# Patient Record
Sex: FEMALE | Race: WHITE | NOT HISPANIC OR LATINO | Employment: UNEMPLOYED | URBAN - NONMETROPOLITAN AREA
[De-identification: names, ages, dates, MRNs, and addresses within clinical notes are randomized per-mention and may not be internally consistent; named-entity substitution may affect disease eponyms.]

---
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
## 2025-02-18 ENCOUNTER — HOSPITAL ENCOUNTER (EMERGENCY)
Facility: HOSPITAL | Age: 20
Discharge: HOME | End: 2025-02-18
Attending: STUDENT IN AN ORGANIZED HEALTH CARE EDUCATION/TRAINING PROGRAM | Admitting: STUDENT IN AN ORGANIZED HEALTH CARE EDUCATION/TRAINING PROGRAM
Payer: COMMERCIAL

## 2025-02-18 VITALS
HEART RATE: 70 BPM | WEIGHT: 126 LBS | OXYGEN SATURATION: 100 % | SYSTOLIC BLOOD PRESSURE: 136 MMHG | TEMPERATURE: 97.9 F | BODY MASS INDEX: 20.99 KG/M2 | DIASTOLIC BLOOD PRESSURE: 86 MMHG | HEIGHT: 65 IN | RESPIRATION RATE: 16 BRPM

## 2025-02-18 DIAGNOSIS — N89.8 VAGINAL DISCHARGE: ICD-10-CM

## 2025-02-18 DIAGNOSIS — R10.2 VAGINAL PAIN: Primary | ICD-10-CM

## 2025-02-18 LAB
BACTERIA UR QL AUTO: ABNORMAL /HPF
BILIRUB UR QL STRIP: NEGATIVE
CLARITY UR: CLEAR
CLUE CELLS SPEC QL WET PREP: ABNORMAL
COLOR UR: YELLOW
GLUCOSE UR STRIP-MCNC: NEGATIVE MG/DL
HGB UR QL STRIP.AUTO: ABNORMAL
HYALINE CASTS UR QL AUTO: ABNORMAL /LPF
HYDATID CYST SPEC WET PREP: ABNORMAL
KETONES UR QL STRIP: NEGATIVE
KOH PREP NAIL: NORMAL
LEUKOCYTE ESTERASE UR QL STRIP.AUTO: ABNORMAL
NITRITE UR QL STRIP: NEGATIVE
PH UR STRIP.AUTO: 7.5 [PH] (ref 5–8)
PROT UR QL STRIP: NEGATIVE
RBC # UR STRIP: ABNORMAL /HPF
REF LAB TEST METHOD: ABNORMAL
SP GR UR STRIP: 1.01 (ref 1–1.03)
SQUAMOUS #/AREA URNS HPF: ABNORMAL /HPF
T VAGINALIS SPEC QL WET PREP: ABNORMAL
UROBILINOGEN UR QL STRIP: ABNORMAL
WBC # UR STRIP: ABNORMAL /HPF
WBC SPEC QL WET PREP: ABNORMAL
YEAST GENITAL QL WET PREP: ABNORMAL

## 2025-02-18 PROCEDURE — 99284 EMERGENCY DEPT VISIT MOD MDM: CPT | Performed by: STUDENT IN AN ORGANIZED HEALTH CARE EDUCATION/TRAINING PROGRAM

## 2025-02-18 PROCEDURE — 87491 CHLMYD TRACH DNA AMP PROBE: CPT

## 2025-02-18 PROCEDURE — 81001 URINALYSIS AUTO W/SCOPE: CPT

## 2025-02-18 PROCEDURE — 87591 N.GONORRHOEAE DNA AMP PROB: CPT

## 2025-02-18 PROCEDURE — 87220 TISSUE EXAM FOR FUNGI: CPT

## 2025-02-18 PROCEDURE — 87210 SMEAR WET MOUNT SALINE/INK: CPT

## 2025-02-18 RX ORDER — METHYLPHENIDATE HYDROCHLORIDE 27 MG/1
27 TABLET, EXTENDED RELEASE ORAL EVERY MORNING
COMMUNITY
Start: 2025-02-11

## 2025-02-18 RX ORDER — FLUCONAZOLE 200 MG/1
TABLET ORAL
Qty: 2 TABLET | Refills: 0 | Status: SHIPPED | OUTPATIENT
Start: 2025-02-18

## 2025-02-18 RX ORDER — METRONIDAZOLE 500 MG/1
500 TABLET ORAL 2 TIMES DAILY
Qty: 28 TABLET | Refills: 0 | Status: SHIPPED | OUTPATIENT
Start: 2025-02-18 | End: 2025-03-04

## 2025-02-18 RX ORDER — DOXYCYCLINE 100 MG/1
100 CAPSULE ORAL 2 TIMES DAILY
Qty: 28 CAPSULE | Refills: 0 | Status: SHIPPED | OUTPATIENT
Start: 2025-02-18 | End: 2025-03-04

## 2025-02-18 NOTE — ED PROVIDER NOTES
EMERGENCY DEPARTMENT ENCOUNTER    Pt Name: Candace Jain  MRN: [id number]  Pt :   2005  Room Number:  01SF/01  Date of encounter:  2025  PCP: Provider, No Known  ED Provider: Luis Miguel Padron PA-C    Historian: Patient, patient's sister at bedside, nursing note      HPI:  Chief Complaint: Vaginal burning irritation and white discharge        Context: Candace Jain is a 19 y.o. female who presents to the ED c/o vaginal burning and irritation for the past 3 days.  Patient also reports some very mild whitish colored discoloration and discharge when wiping.  She reports that her vagina is itchy.  She denies any abdominal or pelvic pain, vaginal bleeding, or any other complaint today.      PAST MEDICAL HISTORY  Past Medical History:   Diagnosis Date    ADHD          PAST SURGICAL HISTORY  History reviewed. No pertinent surgical history.      FAMILY HISTORY  History reviewed. No pertinent family history.      SOCIAL HISTORY  Social History     Socioeconomic History    Marital status: Single   Tobacco Use    Smoking status: Never   Vaping Use    Vaping status: Every Day   Substance and Sexual Activity    Alcohol use: Never    Drug use: Never         ALLERGIES  Patient has no known allergies.        REVIEW OF SYSTEMS  Review of Systems   Constitutional:  Negative for chills and fever.   HENT:  Negative for congestion and sore throat.    Respiratory:  Negative for cough and shortness of breath.    Cardiovascular:  Negative for chest pain.   Gastrointestinal:  Negative for abdominal pain, nausea and vomiting.   Genitourinary:  Positive for vaginal discharge and vaginal pain. Negative for dysuria, flank pain, frequency, pelvic pain and vaginal bleeding.   Musculoskeletal:  Negative for back pain.   Skin:  Negative for wound.   Neurological:  Negative for dizziness and headaches.   Psychiatric/Behavioral:  Negative for confusion.    All other systems reviewed and are negative.         All systems reviewed and  negative except for those discussed in HPI.       PHYSICAL EXAM    I have reviewed the triage vital signs and nursing notes.    ED Triage Vitals [02/18/25 1701]   Temp Heart Rate Resp BP SpO2   97.9 °F (36.6 °C) 80 16 117/75 95 %      Temp src Heart Rate Source Patient Position BP Location FiO2 (%)   Oral Monitor Sitting Left arm --       Physical Exam  Vitals and nursing note reviewed.   Constitutional:       General: She is not in acute distress.     Appearance: She is not ill-appearing, toxic-appearing or diaphoretic.   HENT:      Head: Normocephalic and atraumatic.      Mouth/Throat:      Mouth: Mucous membranes are moist.      Pharynx: Oropharynx is clear.   Eyes:      Extraocular Movements: Extraocular movements intact.   Cardiovascular:      Rate and Rhythm: Normal rate.      Heart sounds: Normal heart sounds.   Pulmonary:      Effort: Pulmonary effort is normal. No respiratory distress.      Breath sounds: Normal breath sounds.   Abdominal:      Tenderness: There is no abdominal tenderness. There is no right CVA tenderness, left CVA tenderness, guarding or rebound.   Skin:     General: Skin is warm and dry.      Findings: No rash.   Neurological:      Mental Status: She is alert.             LAB RESULTS  Recent Results (from the past 24 hours)   Urinalysis With Culture If Indicated - Urine, Clean Catch    Collection Time: 02/18/25  6:13 PM    Specimen: Urine, Clean Catch   Result Value Ref Range    Color, UA Yellow Yellow, Straw    Appearance, UA Clear Clear    pH, UA 7.5 5.0 - 8.0    Specific Gravity, UA 1.012 1.005 - 1.030    Glucose, UA Negative Negative    Ketones, UA Negative Negative    Bilirubin, UA Negative Negative    Blood, UA Small (1+) (A) Negative    Protein, UA Negative Negative    Leuk Esterase, UA Large (3+) (A) Negative    Nitrite, UA Negative Negative    Urobilinogen, UA 1.0 E.U./dL 0.2 - 1.0 E.U./dL   KOH Prep - Urine, Urine, Clean Catch    Collection Time: 02/18/25  6:13 PM    Specimen:  Urine, Clean Catch   Result Value Ref Range    KOH Prep No yeast or hyphal elements seen No yeast or hyphal elements seen   Urinalysis, Microscopic Only - Urine, Clean Catch    Collection Time: 02/18/25  6:13 PM    Specimen: Urine, Clean Catch   Result Value Ref Range    RBC, UA 0-2 None Seen, 0-2 /HPF    WBC, UA 0-2 None Seen, 0-2 /HPF    Bacteria, UA Trace (A) None Seen /HPF    Squamous Epithelial Cells, UA 0-2 None Seen, 0-2 /HPF    Hyaline Casts, UA None Seen None Seen /LPF    Methodology Manual Light Microscopy    Wet Prep, Genital - Swab, Vagina    Collection Time: 02/18/25  7:28 PM    Specimen: Vagina; Swab   Result Value Ref Range    YEAST No yeast seen No yeast seen    HYPHAL ELEMENTS No Hyphal elements seen No Hyphal elements seen    WBC'S 1+ WBC's seen (A) No WBC's seen    Clue Cells, Wet Prep No Clue cells seen No Clue cells seen    Trichomonas, Wet Prep No Trichomonas seen No Trichomonas seen       If labs were ordered, I independently reviewed the results and considered them in treating the patient.        RADIOLOGY  No Radiology Exams Resulted Within Past 24 Hours    I ordered and independently reviewed the above noted radiographic studies.      See radiologist's dictation for official interpretation.        PROCEDURES    Procedures    No orders to display       MEDICATIONS GIVEN IN ER    Medications   cefTRIAXone (ROCEPHIN) 500 mg in lidocaine (XYLOCAINE) 1 % 2 mL IM only syringe (has no administration in time range)         MEDICAL DECISION MAKING, PROGRESS, and CONSULTS    All labs, if obtained, have been independently reviewed by me.  All radiology studies, if obtained, have been reviewed by me and the radiologist dictating the report.  All EKG's, if obtained, have been independently viewed and interpreted by me/my attending physician.      Discussion below represents my analysis of pertinent findings related to patient's condition, differential diagnosis, treatment plan and final  disposition.    Patient is a 19-year-old female presenting for several day history of vaginal itching.  Patient denies any abdominal or pelvic pains and her abdominal exam today is benign.  Her vital signs and pulse oximetry as interpreted by me are all stable and within normal limits.  Initial plan today will be for urinalysis, KOH prep, pelvic exam with wet prep, and send out a gonorrhea and chlamydia testing.  Pelvic exam notable for no cervical motion tenderness, with presence of a green/whitish discharge.  KOH prep negative, no evidence of yeast, wet prep showed no signs of trichomonas, no clue cells.  Urinalysis negative for infection plan will be to cover against PID with ceftriaxone doxycycline and Flagyl.  OB/GYN referral was provided with instructions for patient to follow-up and strict close basis with OB/GYN and return to the ER for any acute changes or worsening condition especially if she develops abdominal or pelvic pain, fever or worsening of discharge or pain.                       Differential diagnosis:    Differential diagnosis included but was not limited to vulvovaginal candidiasis, UTI, vaginal tear, gonorrhea infection, STI, BV      Additional sources:    - Discussed/ obtained information from independent historians: Patient's sister at bedside    - External (non-ED) record review: None    - Chronic or social conditions impacting care: None    Orders placed during this visit:  Orders Placed This Encounter   Procedures    Chlamydia trachomatis, Neisseria gonorrhoeae, PCR - Urine, Urine, Clean Catch    KOH Prep - Urine, Urine, Clean Catch    Wet Prep, Genital - Swab, Vagina    Urinalysis With Culture If Indicated - Urine, Clean Catch    Urinalysis, Microscopic Only - Urine, Clean Catch    Supplies To Bedside - Notify MD When Ready- Pelvic Cart / Set Up         Additional orders considered but not ordered: None      ED Course:    Consultants: None    ED Course as of 02/18/25 2124   Tue Feb 18,  2025 1833 I have reviewed the mid-level provider(s) note and verbally discussed the case/plan of care.  I was available for consultation as needed at all times during the patient's visit in the Emergency Department.  I agree with the clinical impression, plan, and disposition unless otherwise stated in the MDM below.    ATTENDING ATTESTATION  HPI: 19-year-old female presents with white vaginal discharge x 3 days.  Sexually active.    MDM: ED workup reviewed.    I have reviewed the labs results. Clinically unremarkable.    Notable findings include: Wet prep negative for yeast, clue cells, trichomonas.  Gonorrhea and Chlamydia pending.    No cervical motion tenderness on pelvic exam by [facility].    Recommended empiric coverage with antibiotics.     [JS]   1937 WBC'S(!): 1+ WBC's seen [TG]      ED Course User Index  [JS] Darryl Alaniz DO  [TG] Luis Miguel Padron PA-C              Shared Decision Making:  After my consideration of clinical presentation and any laboratory/radiology studies obtained, I discussed the findings with the patient/patient representative who is in agreement with the treatment plan and the final disposition.   Risks and benefits of discharge and/or observation/admission were discussed.       AS OF 21:24 EST VITALS:    BP - 136/86  HR - 70  TEMP - 97.9 °F (36.6 °C) (Oral)  O2 SATS - 100%                  DIAGNOSIS  Final diagnoses:   Vaginal pain   Vaginal discharge         DISPOSITION  Discharge      Please note that portions of this document were completed with voice recognition software.      Luis Miguel Padron PA-C  02/18/25 2129

## 2025-02-19 NOTE — DISCHARGE INSTRUCTIONS
We are covering you with multiple antibiotics to protect against causes of pelvic inflammatory disease, and have sent out coronary and chlamydia testing.  We will contact you only if these results are positive but if they are positive you are already treated successfully for this.  We encourage you to follow-up with an OB/GYN as soon as possible.  We have also prescribed you Diflucan to help with possible yeast infection with instructions to take 1 tablet today then repeat in 7 days.  Return to the ER for any acute changes or worsening of your condition right away.

## 2025-02-21 LAB
C TRACH RRNA SPEC QL NAA+PROBE: NEGATIVE
N GONORRHOEA RRNA SPEC QL NAA+PROBE: NEGATIVE

## 2025-02-26 ENCOUNTER — OFFICE VISIT (OUTPATIENT)
Dept: OBSTETRICS AND GYNECOLOGY | Facility: CLINIC | Age: 20
End: 2025-02-26
Payer: COMMERCIAL

## 2025-02-26 VITALS
WEIGHT: 127.8 LBS | BODY MASS INDEX: 21.29 KG/M2 | DIASTOLIC BLOOD PRESSURE: 60 MMHG | SYSTOLIC BLOOD PRESSURE: 98 MMHG | HEIGHT: 65 IN

## 2025-02-26 DIAGNOSIS — N76.0 ACUTE VAGINITIS: Primary | ICD-10-CM

## 2025-02-26 NOTE — PROGRESS NOTES
Subjective   Chief Complaint   Patient presents with    Vomiting During Pregnancy     New Patient here for ER follow up. Vaginal itching and discharge     Candace Jain is a 19 y.o. year old .  Patient's last menstrual period was 2025 (exact date).  She presents to be seen because of symptoms of d/c, pruritus, pain with urination. .  Patient was seen in the ED.  Records reviewed.  Started on doxycycline and Flagyl as well as Diflucan.  Symptoms have abated.  No complaints.  No fever chills nausea vomiting or diarrhea.    OTHER COMPLAINTS:  Nothing else    The following portions of the patient's history were reviewed and updated as appropriate:She  has a past medical history of ADHD, Anemia, Anxiety, Trauma, and Urinary tract infection.  She does not have a problem list on file.  She  has a past surgical history that includes [place] tooth extraction.  Her family history includes Diabetes in her maternal great-grandmother.  She  reports that she has quit smoking. Her smoking use included cigarettes. She does not have any smokeless tobacco history on file. She reports that she does not drink alcohol and does not use drugs.  Current Outpatient Medications   Medication Sig Dispense Refill    Concerta 27 MG CR tablet Take 1 tablet by mouth Every Morning      doxycycline (VIBRAMYCIN) 100 MG capsule Take 1 capsule by mouth 2 (Two) Times a Day for 14 days. 28 capsule 0    fluconazole (Diflucan) 200 MG tablet Take 1 tablet by mouth now then repeat in 7 days 2 tablet 0    Methylphenidate HCl ER 18 MG CR tablet Take 27 mg by mouth Every Morning.      metroNIDAZOLE (Flagyl) 500 MG tablet Take 1 tablet by mouth 2 (Two) Times a Day for 14 days. 28 tablet 0     No current facility-administered medications for this visit.     Current Outpatient Medications on File Prior to Visit   Medication Sig    Concerta 27 MG CR tablet Take 1 tablet by mouth Every Morning    doxycycline (VIBRAMYCIN) 100 MG capsule Take 1 capsule by  "mouth 2 (Two) Times a Day for 14 days.    fluconazole (Diflucan) 200 MG tablet Take 1 tablet by mouth now then repeat in 7 days    Methylphenidate HCl ER 18 MG CR tablet Take 27 mg by mouth Every Morning.    metroNIDAZOLE (Flagyl) 500 MG tablet Take 1 tablet by mouth 2 (Two) Times a Day for 14 days.     No current facility-administered medications on file prior to visit.     She has No Known Allergies.    Social History    Tobacco Use      Smoking status: Former        Types: Cigarettes      Smokeless tobacco: Not on file    Review of Systems  Consitutional POS: nothing reported    NEG: anorexia or night sweats   Gastointestinal POS: nothing reported    NEG: bloating, change in bowel habits, melena, or reflux symptoms   Genitourinary POS: nothing reported    NEG: dysuria or hematuria   Integument POS: nothing reported    NEG: moles that are changing in size, shape, color or rashes   Breast POS: nothing reported    NEG: persistent breast lump, skin dimpling, or nipple discharge         Respiratory: negative  Cardiovascular: negative  GYN:  negative          Objective   BP 98/60   Ht 165.1 cm (65\")   Wt 58 kg (127 lb 12.8 oz)   LMP 02/20/2025 (Exact Date)   BMI 21.27 kg/m²     General:  well developed; well nourished  no acute distress  mentation appropriate   Skin:  No suspicious lesions seen   Thyroid: not examined   Lungs:  breathing is unlabored   Heart:  Not performed.   Breasts:  Not performed.   Abdomen: soft, non-tender; no masses  no umbilical or inguinal hernias are present  no hepato-splenomegaly   Pelvis: Not performed.     Psychiatric: Alert and oriented ×3, mood and affect appropriate  HEENT: Atraumatic, normocephalic, normal scleral icterus  Extremities: 2+ pulses bilaterally, no edema      Lab Review   STD swabs for GC, chlamydia, and trichimoniasis    Imaging   No data reviewed        Assessment   Vaginitis     Plan   Antibiotics.  Unlikely to have been PID given cultures all negative.  Most " likely bacterial vaginosis.  Precautions given.      No orders of the defined types were placed in this encounter.         This note was electronically signed.      February 26, 2025

## 2025-02-27 ENCOUNTER — PATIENT ROUNDING (BHMG ONLY) (OUTPATIENT)
Dept: OBSTETRICS AND GYNECOLOGY | Facility: CLINIC | Age: 20
End: 2025-02-27
Payer: COMMERCIAL

## 2025-02-27 NOTE — PROGRESS NOTES
February 27, 2025      My name is Tory Soto      I am  with [facility]  [address], [address]  [place] [address] [phone].      I am reaching out to you to officially welcome you to our practice and ask about your recent visit.     Tell me about your visit with us. What things went well?         We're always looking for ways to make our patients' experiences even better. Do you have recommendations on ways we may improve?      Overall were you satisfied with your first visit to our practice?        I would appreciate you taking the time to complete the survey. Your feedback is greatly appreciated! Please reply directly to this e-mail should you have any questions or concerns.      Thank you, and have a great day.     Patient Rounding sent to patient via [employer] e-mail.